# Patient Record
Sex: MALE | Race: WHITE | HISPANIC OR LATINO | Employment: STUDENT | URBAN - METROPOLITAN AREA
[De-identification: names, ages, dates, MRNs, and addresses within clinical notes are randomized per-mention and may not be internally consistent; named-entity substitution may affect disease eponyms.]

---

## 2022-07-29 ENCOUNTER — PATIENT MESSAGE (OUTPATIENT)
Dept: FAMILY MEDICINE | Facility: CLINIC | Age: 22
End: 2022-07-29

## 2022-07-29 DIAGNOSIS — Z00.00 HEALTHCARE MAINTENANCE: Primary | ICD-10-CM

## 2022-07-29 DIAGNOSIS — E78.1 HYPERTRIGLYCERIDEMIA: ICD-10-CM

## 2022-07-30 NOTE — TELEPHONE ENCOUNTER
Please call patient and tell him that orders have been placed to obtain a lipid panel as well as routine labs for him for initially appointment evaluation.  He is to obtain these after an overnight fast of at least 8 hours.  These can be scheduled.  Look for to me him soon

## 2022-08-01 ENCOUNTER — PATIENT MESSAGE (OUTPATIENT)
Dept: FAMILY MEDICINE | Facility: CLINIC | Age: 22
End: 2022-08-01

## 2022-08-04 ENCOUNTER — OFFICE VISIT (OUTPATIENT)
Dept: FAMILY MEDICINE | Facility: CLINIC | Age: 22
End: 2022-08-04
Payer: COMMERCIAL

## 2022-08-04 VITALS
OXYGEN SATURATION: 98 % | SYSTOLIC BLOOD PRESSURE: 120 MMHG | DIASTOLIC BLOOD PRESSURE: 60 MMHG | BODY MASS INDEX: 27.62 KG/M2 | HEIGHT: 69 IN | WEIGHT: 186.5 LBS | HEART RATE: 70 BPM

## 2022-08-04 DIAGNOSIS — Z83.438 FAMILY HISTORY OF HYPERLIPIDEMIA: ICD-10-CM

## 2022-08-04 DIAGNOSIS — E66.3 OVERWEIGHT (BMI 25.0-29.9): ICD-10-CM

## 2022-08-04 DIAGNOSIS — E78.1 HYPERTRIGLYCERIDEMIA: Primary | ICD-10-CM

## 2022-08-04 DIAGNOSIS — Z76.89 ENCOUNTER TO ESTABLISH CARE WITH NEW DOCTOR: ICD-10-CM

## 2022-08-04 DIAGNOSIS — Z83.3 FAMILY HISTORY OF DIABETES MELLITUS: ICD-10-CM

## 2022-08-04 DIAGNOSIS — E78.5 DYSLIPIDEMIA: ICD-10-CM

## 2022-08-04 DIAGNOSIS — R73.01 IMPAIRED FASTING GLUCOSE: ICD-10-CM

## 2022-08-04 DIAGNOSIS — Z01.89 ENCOUNTER FOR LABORATORY TEST: ICD-10-CM

## 2022-08-04 DIAGNOSIS — R74.01 TRANSAMINITIS: ICD-10-CM

## 2022-08-04 PROCEDURE — 99214 PR OFFICE/OUTPT VISIT, EST, LEVL IV, 30-39 MIN: ICD-10-PCS | Mod: S$PBB,,, | Performed by: INTERNAL MEDICINE

## 2022-08-04 PROCEDURE — 99999 PR PBB SHADOW E&M-EST. PATIENT-LVL III: ICD-10-PCS | Mod: PBBFAC,,, | Performed by: INTERNAL MEDICINE

## 2022-08-04 PROCEDURE — 99999 PR PBB SHADOW E&M-EST. PATIENT-LVL III: CPT | Mod: PBBFAC,,, | Performed by: INTERNAL MEDICINE

## 2022-08-04 PROCEDURE — 99214 OFFICE O/P EST MOD 30 MIN: CPT | Mod: S$PBB,,, | Performed by: INTERNAL MEDICINE

## 2022-08-04 NOTE — PATIENT INSTRUCTIONS
Hypertriglyceridemia: Maintain low fat high fiber diet, exercise regularly. Weight reduction where indicated. Limit dairy products; keep well hydrated. Avoid any alcohol.   -     Lipid Panel; Future; Expected date: 08/04/2022  -     Comprehensive Metabolic Panel; Future; Expected date: 08/04/2022    Dyslipidemia; increase aerobic activity.   -     Lipid Panel; Future; Expected date: 08/04/2022  -     Comprehensive Metabolic Panel; Future; Expected date: 08/04/2022    Overweight (BMI 25.0-29.9; Caloric restriction w regular exercise and weight reduction.    Family history of hyperlipidemia    Impaired fasting glucose: Exercise recommended with weight reduction and low carb diet; we'll follow hemoglobin A1c's with you periodically.  -     Hemoglobin A1C; Future; Expected date: 08/04/2022  -     Comprehensive Metabolic Panel; Future; Expected date: 08/04/2022    Family history of diabetes mellitus    Encounter for laboratory test    Transaminitis; no NSAID agents; limit tylenol use as well; keep well hydrated w min imum 6-7 glasses of fluid a day  -     Comprehensive Metabolic Panel; Future; Expected date: 08/04/2022

## 2022-08-04 NOTE — PROGRESS NOTES
Subjective:       Patient ID: Sunil Van is a 22 y.o. male.    Chief Complaint:  Here today to establish care  HPI:  Patient here today to establish care with me as his new PCP at [facility].  Hypertriglyceridemia: Maintain low fat high fiber diet, exercise regularly. Weight reduction where indicated. Limit dairy products; keep well hydrated. Avoid any alcohol.  Lipid profile:  Cholesterol 156/triglyceride 280/HDL 29/LDL 71.  Add Metamucil once daily  -     Lipid Panel; Future; Expected date: 08/04/2022  -     Comprehensive Metabolic Panel; Future; Expected date: 08/04/2022  Dyslipidemia; increase aerobic activity.   -     Lipid Panel; Future; Expected date: 08/04/2022  -     Comprehensive Metabolic Panel; Future; Expected date: 08/04/2022  Overweight (BMI 25.0-29.9; BMI 27.54.  Caloric restriction w regular exercise and weight reduction.  Last year gained a lot a weight with COVID infection.  One thousand four hundred level triglyceride noted then.  No alcohol.  Lost a lot a week 10 15 lb with being in the marching band.   Family history of hyperlipidemia: Mom with elevated cholesterol levels  Impaired fasting glucose: Exercise recommended with weight reduction and low carb diet; we'll follow hemoglobin A1c's with you periodically.  Impaired fasting blood sugar at 100.  Hemoglobin A1c with follow-up FBS  -     Hemoglobin A1C; Future; Expected date: 08/04/2022  -     Comprehensive Metabolic Panel; Future; Expected date: 08/04/2022  Family history of diabetes mellitus: Maternal aunt and maternal great grandmother  Encounter for laboratory test:  Labs reviewed with patient ordered follow-up.  Transaminitis; ALT 53.  no NSAID agents; limit tylenol use as well; keep well hydrated w minimum 6-7 glasses of fluid a day; wants to wait on US abd; work on diet and exercise. Keep well hydrated.  Wants to wait on checking hepatitis-B virus/hepatitis C virus/and ferritin level  -     Comprehensive  "Metabolic Panel; Future; Expected date: 08/04/2022  Total time 5:13 p.m. through 5:57 p.m..  Greater than 50% of the time spent in discussion, counseling, and review.  Medications reviewed and addressed.  Labs reviewed and discussed with patient and ordered for follow-up.  Various different diagnosis is addressed as well as plan of care.  .    Review of Systems   Constitutional: Negative for appetite change and unexpected weight change.   HENT: Negative for congestion, postnasal drip, rhinorrhea and sinus pressure.         Denies seasonal allergies, or perennial allergies   Eyes: Negative for discharge and itching.   Respiratory: Negative for cough, chest tightness, shortness of breath and wheezing.    Cardiovascular: Negative for chest pain, palpitations and leg swelling.   Gastrointestinal: Negative for abdominal pain, blood in stool, constipation, diarrhea, nausea and vomiting.   Endocrine: Negative for polydipsia, polyphagia and polyuria.   Genitourinary: Negative for dysuria and hematuria.   Musculoskeletal: Negative for arthralgias and myalgias.   Skin: Negative for rash.   Allergic/Immunologic: Negative for environmental allergies and food allergies.   Neurological: Negative for tremors, seizures and headaches.   Hematological: Negative for adenopathy. Does not bruise/bleed easily.   Psychiatric/Behavioral: Negative for dysphoric mood. The patient is not nervous/anxious.         Denies anxiety or depression.      Objective:        Vitals:    08/04/22 1647   BP: 120/60   Pulse: 70   SpO2: 98%   Weight: 84.6 kg (186 lb 8.2 oz)   Height: 5' 9" (1.753 m)       BMI Readings from Last 3 Encounters:   08/04/22 27.54 kg/m²   06/07/11 16.88 kg/m² (46 %, Z= -0.11)*     * Growth percentiles are based on CDC (Boys, 2-20 Years) data.        Wt Readings from Last 3 Encounters:   08/04/22 1647 84.6 kg (186 lb 8.2 oz)   06/07/11 0212 37.7 kg (83 lb 2.9 oz) (63 %, Z= 0.33)*   11/18/10 0359 35 kg (77 lb 2.6 oz) (61 %, Z= " 0.29)*     * Growth percentiles are based on [facility] (Boys, 2-20 Years) data.        BP Readings from Last 3 Encounters:   08/04/22 120/60   06/07/11 108/56 (73 %, Z = 0.61 /  26 %, Z = -0.64)*     *BP percentiles are based on the 2017 AAP Clinical Practice Guideline for boys            Topic Date Due    HPV Vaccines (1 - Male 2-dose series) Never done        Health Maintenance Due   Topic Date Due    Hepatitis C Screening  Never done    HPV Vaccines (1 - Male 2-dose series) Never done    HIV Screening  Never done    TETANUS VACCINE  Never done    COVID-19 Vaccine (2 - Moderna series) 04/21/2021         No past medical history on file.    No past surgical history on file.    Social History     Tobacco Use    Smoking status: Never Smoker    Smokeless tobacco: Never Used       No family history on file.    Review of patient's allergies indicates:  No Known Allergies    No current outpatient medications on file prior to visit.     No current facility-administered medications on file prior to visit.       Physical Exam  Vitals reviewed.   Constitutional:       Appearance: He is well-developed.   HENT:      Head: Normocephalic and atraumatic.   Neck:      Thyroid: No thyromegaly.   Cardiovascular:      Rate and Rhythm: Normal rate and regular rhythm.      Heart sounds: Normal heart sounds. No murmur heard.    No gallop.   Pulmonary:      Effort: Pulmonary effort is normal. No respiratory distress.      Breath sounds: Normal breath sounds. No wheezing or rales.   Abdominal:      General: Bowel sounds are normal. There is no distension.      Palpations: Abdomen is soft.      Tenderness: There is no abdominal tenderness. There is no guarding or rebound.   Musculoskeletal:         General: Normal range of motion.      Cervical back: Normal range of motion and neck supple.      Right lower leg: No edema.      Left lower leg: No edema.   Lymphadenopathy:      Cervical: No cervical adenopathy.   Skin:     Findings: No rash.    Neurological:      Mental Status: He is alert and oriented to person, place, and time.      Comments: Moves all 4 extremities fine.   Psychiatric:         Behavior: Behavior normal.         Thought Content: Thought content normal.         Assessment:       1. Hypertriglyceridemia    2. Dyslipidemia    3. Overweight (BMI 25.0-29.9)    4. Family history of hyperlipidemia    5. Impaired fasting glucose    6. Family history of diabetes mellitus    7. Encounter for laboratory test    8. Transaminitis    9. Encounter to establish care with new doctor        Plan:       Hypertriglyceridemia: Maintain low fat high fiber diet, exercise regularly. Weight reduction  indicated. Limit dairy products; keep well hydrated. Avoid any alcohol.   -     Lipid Panel; Future; Expected date: 08/04/2022  -     Comprehensive Metabolic Panel; Future; Expected date: 08/04/2022    Dyslipidemia; increase aerobic activity.   -     Lipid Panel; Future; Expected date: 08/04/2022  -     Comprehensive Metabolic Panel; Future; Expected date: 08/04/2022    Overweight (BMI 25.0-29.9; BMI 27.54.  Caloric restriction w regular exercise and weight reduction.    Family history of hyperlipidemia:  Mom with elevated cholesterol.    Impaired fasting glucose: Exercise recommended with weight reduction and low carb diet; we'll follow hemoglobin A1c's with you periodically.  -     Hemoglobin A1C; Future; Expected date: 08/04/2022  -     Comprehensive Metabolic Panel; Future; Expected date: 08/04/2022    Family history of diabetes mellitus: Involving maternal aunt and maternal great grandmother    Encounter for laboratory test: Labs reviewed and discussed with patient at length in ordered for follow-up.  Wants to wait on hepatitis-B virus/hepatitis C virus/and ferritin levels    Transaminitis; no NSAID agents; limit tylenol use as well; keep well hydrated w minimum 6-7 glasses of fluid a day; wants to wait on US abd; work on diet and exercise. Keep well  hydrated.  -     Comprehensive Metabolic Panel; Future; Expected date: 08/04/2022    Encounter to establish care with new physician

## 2022-08-20 PROBLEM — R73.01 IMPAIRED FASTING GLUCOSE: Status: ACTIVE | Noted: 2022-08-20

## 2022-08-20 PROBLEM — Z01.89 ENCOUNTER FOR LABORATORY TEST: Status: ACTIVE | Noted: 2022-08-20

## 2022-08-20 PROBLEM — E78.1 HYPERTRIGLYCERIDEMIA: Status: ACTIVE | Noted: 2022-08-20

## 2022-08-20 PROBLEM — E66.3 OVERWEIGHT (BMI 25.0-29.9): Status: ACTIVE | Noted: 2022-08-20

## 2022-08-20 PROBLEM — E78.5 DYSLIPIDEMIA: Status: ACTIVE | Noted: 2022-08-20

## 2022-08-20 PROBLEM — Z83.3 FAMILY HISTORY OF DIABETES MELLITUS: Status: ACTIVE | Noted: 2022-08-20

## 2022-08-20 PROBLEM — R74.01 TRANSAMINITIS: Status: ACTIVE | Noted: 2022-08-20

## 2022-08-20 PROBLEM — Z83.438 FAMILY HISTORY OF HYPERLIPIDEMIA: Status: ACTIVE | Noted: 2022-08-20

## 2022-12-05 ENCOUNTER — PATIENT OUTREACH (OUTPATIENT)
Dept: ADMINISTRATIVE | Facility: HOSPITAL | Age: 22
End: 2022-12-05

## 2023-07-05 ENCOUNTER — LAB VISIT (OUTPATIENT)
Dept: LAB | Facility: HOSPITAL | Age: 23
End: 2023-07-05
Attending: INTERNAL MEDICINE
Payer: COMMERCIAL

## 2023-07-05 ENCOUNTER — OFFICE VISIT (OUTPATIENT)
Dept: FAMILY MEDICINE | Facility: CLINIC | Age: 23
End: 2023-07-05
Payer: COMMERCIAL

## 2023-07-05 VITALS
SYSTOLIC BLOOD PRESSURE: 122 MMHG | RESPIRATION RATE: 18 BRPM | OXYGEN SATURATION: 98 % | BODY MASS INDEX: 28.99 KG/M2 | WEIGHT: 195.75 LBS | DIASTOLIC BLOOD PRESSURE: 78 MMHG | HEART RATE: 57 BPM | HEIGHT: 69 IN

## 2023-07-05 DIAGNOSIS — E66.3 OVERWEIGHT (BMI 25.0-29.9): ICD-10-CM

## 2023-07-05 DIAGNOSIS — R73.01 IMPAIRED FASTING GLUCOSE: ICD-10-CM

## 2023-07-05 DIAGNOSIS — Z11.4 SCREENING FOR HIV (HUMAN IMMUNODEFICIENCY VIRUS): ICD-10-CM

## 2023-07-05 DIAGNOSIS — Z83.438 FAMILY HISTORY OF HYPERLIPIDEMIA: ICD-10-CM

## 2023-07-05 DIAGNOSIS — E78.1 HYPERTRIGLYCERIDEMIA: ICD-10-CM

## 2023-07-05 DIAGNOSIS — Z11.59 ENCOUNTER FOR HCV SCREENING TEST FOR LOW RISK PATIENT: ICD-10-CM

## 2023-07-05 DIAGNOSIS — R74.01 TRANSAMINITIS: ICD-10-CM

## 2023-07-05 DIAGNOSIS — E78.5 DYSLIPIDEMIA: ICD-10-CM

## 2023-07-05 DIAGNOSIS — Z83.3 FAMILY HISTORY OF DIABETES MELLITUS: ICD-10-CM

## 2023-07-05 DIAGNOSIS — Z00.00 HEALTHCARE MAINTENANCE: ICD-10-CM

## 2023-07-05 DIAGNOSIS — Z00.00 ANNUAL PHYSICAL EXAM: Primary | ICD-10-CM

## 2023-07-05 LAB
ALBUMIN SERPL BCP-MCNC: 4.3 G/DL (ref 3.5–5.2)
ALP SERPL-CCNC: 69 U/L (ref 55–135)
ALT SERPL W/O P-5'-P-CCNC: 34 U/L (ref 10–44)
ANION GAP SERPL CALC-SCNC: 9 MMOL/L (ref 8–16)
AST SERPL-CCNC: 17 U/L (ref 10–40)
BASOPHILS # BLD AUTO: 0.03 K/UL (ref 0–0.2)
BASOPHILS NFR BLD: 0.4 % (ref 0–1.9)
BILIRUB SERPL-MCNC: 0.4 MG/DL (ref 0.1–1)
BUN SERPL-MCNC: 9 MG/DL (ref 6–20)
CALCIUM SERPL-MCNC: 9.2 MG/DL (ref 8.7–10.5)
CHLORIDE SERPL-SCNC: 107 MMOL/L (ref 95–110)
CHOLEST SERPL-MCNC: 127 MG/DL (ref 120–199)
CHOLEST/HDLC SERPL: 5.1 {RATIO} (ref 2–5)
CO2 SERPL-SCNC: 26 MMOL/L (ref 23–29)
CREAT SERPL-MCNC: 0.9 MG/DL (ref 0.5–1.4)
DIFFERENTIAL METHOD: NORMAL
EOSINOPHIL # BLD AUTO: 0.1 K/UL (ref 0–0.5)
EOSINOPHIL NFR BLD: 1 % (ref 0–8)
ERYTHROCYTE [DISTWIDTH] IN BLOOD BY AUTOMATED COUNT: 12.8 % (ref 11.5–14.5)
EST. GFR  (NO RACE VARIABLE): >60 ML/MIN/1.73 M^2
ESTIMATED AVG GLUCOSE: 97 MG/DL (ref 68–131)
GLUCOSE SERPL-MCNC: 96 MG/DL (ref 70–110)
HBA1C MFR BLD: 5 % (ref 4–5.6)
HCT VFR BLD AUTO: 44.6 % (ref 40–54)
HCV AB SERPL QL IA: NORMAL
HDLC SERPL-MCNC: 25 MG/DL (ref 40–75)
HDLC SERPL: 19.7 % (ref 20–50)
HGB BLD-MCNC: 15.2 G/DL (ref 14–18)
HIV 1+2 AB+HIV1 P24 AG SERPL QL IA: NORMAL
IMM GRANULOCYTES # BLD AUTO: 0.02 K/UL (ref 0–0.04)
IMM GRANULOCYTES NFR BLD AUTO: 0.3 % (ref 0–0.5)
LDLC SERPL CALC-MCNC: 37.4 MG/DL (ref 63–159)
LYMPHOCYTES # BLD AUTO: 2.8 K/UL (ref 1–4.8)
LYMPHOCYTES NFR BLD: 41 % (ref 18–48)
MCH RBC QN AUTO: 30 PG (ref 27–31)
MCHC RBC AUTO-ENTMCNC: 34.1 G/DL (ref 32–36)
MCV RBC AUTO: 88 FL (ref 82–98)
MONOCYTES # BLD AUTO: 0.8 K/UL (ref 0.3–1)
MONOCYTES NFR BLD: 11 % (ref 4–15)
NEUTROPHILS # BLD AUTO: 3.2 K/UL (ref 1.8–7.7)
NEUTROPHILS NFR BLD: 46.3 % (ref 38–73)
NONHDLC SERPL-MCNC: 102 MG/DL
NRBC BLD-RTO: 0 /100 WBC
PLATELET # BLD AUTO: 235 K/UL (ref 150–450)
PMV BLD AUTO: 10.3 FL (ref 9.2–12.9)
POTASSIUM SERPL-SCNC: 3.9 MMOL/L (ref 3.5–5.1)
PROT SERPL-MCNC: 7.2 G/DL (ref 6–8.4)
RBC # BLD AUTO: 5.07 M/UL (ref 4.6–6.2)
SODIUM SERPL-SCNC: 142 MMOL/L (ref 136–145)
TRIGL SERPL-MCNC: 323 MG/DL (ref 30–150)
TSH SERPL DL<=0.005 MIU/L-ACNC: 1.4 UIU/ML (ref 0.4–4)
WBC # BLD AUTO: 6.91 K/UL (ref 3.9–12.7)

## 2023-07-05 PROCEDURE — 87389 HIV-1 AG W/HIV-1&-2 AB AG IA: CPT | Performed by: INTERNAL MEDICINE

## 2023-07-05 PROCEDURE — 3044F PR MOST RECENT HEMOGLOBIN A1C LEVEL <7.0%: ICD-10-PCS | Mod: CPTII,S$GLB,, | Performed by: INTERNAL MEDICINE

## 2023-07-05 PROCEDURE — 99999 PR PBB SHADOW E&M-EST. PATIENT-LVL III: CPT | Mod: PBBFAC,,, | Performed by: INTERNAL MEDICINE

## 2023-07-05 PROCEDURE — 80061 LIPID PANEL: CPT | Performed by: INTERNAL MEDICINE

## 2023-07-05 PROCEDURE — 84443 ASSAY THYROID STIM HORMONE: CPT | Performed by: INTERNAL MEDICINE

## 2023-07-05 PROCEDURE — 36415 COLL VENOUS BLD VENIPUNCTURE: CPT | Mod: PN | Performed by: INTERNAL MEDICINE

## 2023-07-05 PROCEDURE — 1160F PR REVIEW ALL MEDS BY PRESCRIBER/CLIN PHARMACIST DOCUMENTED: ICD-10-PCS | Mod: CPTII,S$GLB,, | Performed by: INTERNAL MEDICINE

## 2023-07-05 PROCEDURE — 3008F PR BODY MASS INDEX (BMI) DOCUMENTED: ICD-10-PCS | Mod: CPTII,S$GLB,, | Performed by: INTERNAL MEDICINE

## 2023-07-05 PROCEDURE — 3008F BODY MASS INDEX DOCD: CPT | Mod: CPTII,S$GLB,, | Performed by: INTERNAL MEDICINE

## 2023-07-05 PROCEDURE — 3078F PR MOST RECENT DIASTOLIC BLOOD PRESSURE < 80 MM HG: ICD-10-PCS | Mod: CPTII,S$GLB,, | Performed by: INTERNAL MEDICINE

## 2023-07-05 PROCEDURE — 99395 PREV VISIT EST AGE 18-39: CPT | Mod: S$GLB,,, | Performed by: INTERNAL MEDICINE

## 2023-07-05 PROCEDURE — 3074F SYST BP LT 130 MM HG: CPT | Mod: CPTII,S$GLB,, | Performed by: INTERNAL MEDICINE

## 2023-07-05 PROCEDURE — 83036 HEMOGLOBIN GLYCOSYLATED A1C: CPT | Performed by: INTERNAL MEDICINE

## 2023-07-05 PROCEDURE — 99395 PR PREVENTIVE VISIT,EST,18-39: ICD-10-PCS | Mod: S$GLB,,, | Performed by: INTERNAL MEDICINE

## 2023-07-05 PROCEDURE — 1159F PR MEDICATION LIST DOCUMENTED IN MEDICAL RECORD: ICD-10-PCS | Mod: CPTII,S$GLB,, | Performed by: INTERNAL MEDICINE

## 2023-07-05 PROCEDURE — 3074F PR MOST RECENT SYSTOLIC BLOOD PRESSURE < 130 MM HG: ICD-10-PCS | Mod: CPTII,S$GLB,, | Performed by: INTERNAL MEDICINE

## 2023-07-05 PROCEDURE — 3044F HG A1C LEVEL LT 7.0%: CPT | Mod: CPTII,S$GLB,, | Performed by: INTERNAL MEDICINE

## 2023-07-05 PROCEDURE — 99999 PR PBB SHADOW E&M-EST. PATIENT-LVL III: ICD-10-PCS | Mod: PBBFAC,,, | Performed by: INTERNAL MEDICINE

## 2023-07-05 PROCEDURE — 85025 COMPLETE CBC W/AUTO DIFF WBC: CPT | Performed by: INTERNAL MEDICINE

## 2023-07-05 PROCEDURE — 1160F RVW MEDS BY RX/DR IN RCRD: CPT | Mod: CPTII,S$GLB,, | Performed by: INTERNAL MEDICINE

## 2023-07-05 PROCEDURE — 86803 HEPATITIS C AB TEST: CPT | Performed by: INTERNAL MEDICINE

## 2023-07-05 PROCEDURE — 80053 COMPREHEN METABOLIC PANEL: CPT | Performed by: INTERNAL MEDICINE

## 2023-07-05 PROCEDURE — 1159F MED LIST DOCD IN RCRD: CPT | Mod: CPTII,S$GLB,, | Performed by: INTERNAL MEDICINE

## 2023-07-05 PROCEDURE — 3078F DIAST BP <80 MM HG: CPT | Mod: CPTII,S$GLB,, | Performed by: INTERNAL MEDICINE

## 2023-07-05 NOTE — PROGRESS NOTES
Subjective:       Patient ID: Sunil Van is a 22 y.o. male.      Patient here today to perform his Annual Wellness evaluation with me.  Past medical history and surgical history delineated and noted. Social medical history and family medical history also delineated and noted.  Review of systems obtained at length prior to physical exam being performed.  Medications reviewed as well and addressed.  Labs reviewed and ordered for follow-up as needed.      Chief Complaint: Annual Exam    HPI:  Here today for an annual wellness examination.  Annual Wellness Plan:  Maintain healthy lifestyle choices; regular exercise with caloric restriction where needed to lose weight.  Limit caffeine and alcohol intake when needed.  Keep follow up appointments with providers as directed and obtain workups as recommended as well. Obtain annual physical exam.  Patient does not smoke cigarette or vapes.  No alcohol intake.    Annual physical exam    Hypertriglyceridemia: limit dairy products; exercise.  Continued attempts at weight reduction with BMI 28.91 overweight.  And limit any alcohol intake as well  -     Comprehensive Metabolic Panel; Future; Expected date: 07/05/2023  -     Lipid Panel; Future; Expected date: 07/05/2023    Dyslipidemia; aerobic activity increase.   -     Comprehensive Metabolic Panel; Future; Expected date: 07/05/2023  -     Lipid Panel; Future; Expected date: 07/05/2023    Overweight (BMI 25.0-29.9); low-fat diet with caloric restriction w regular exercise and weight reduction.   -     Comprehensive Metabolic Panel; Future; Expected date: 07/05/2023  -     Hemoglobin A1C; Future; Expected date: 07/05/2023  -     TSH; Future; Expected date: 07/05/2023    Family history of hyperlipidemia    Impaired fasting glucose; .Exercise recommended with weight reduction and low carb diet; we'll follow hemoglobin A1c's with you periodically.  -     Comprehensive Metabolic Panel; Future; Expected date: 07/05/2023  -    "  Hemoglobin A1C; Future; Expected date: 07/05/2023  -     TSH; Future; Expected date: 07/05/2023    Family history of diabetes mellitus: maternal aunt and maternal great grandmother.   -     Comprehensive Metabolic Panel; Future; Expected date: 07/05/2023  -     Hemoglobin A1C; Future; Expected date: 07/05/2023    Transaminitis; limit NSAID use like aleve, ibuprofen; keep well hydrated at 6-8 glasses of fluid a day.  Can use Tylenol over-the-counter if needed for any pain relief.  -     Comprehensive Metabolic Panel; Future; Expected date: 07/05/2023    Encounter for HCV screening test for low risk patient  -     Hepatitis C Antibody; Future; Expected date: 07/05/2023    Screening for HIV (human immunodeficiency virus)  -     HIV 1/2 Ag/Ab (4th Gen); Future; Expected date: 07/05/2023    Healthcare maintenance  -     Comprehensive Metabolic Panel; Future; Expected date: 07/05/2023  -     Lipid Panel; Future; Expected date: 07/05/2023  -     Hemoglobin A1C; Future; Expected date: 07/05/2023  -     CBC Auto Differential; Future; Expected date: 07/05/2023  -     Hepatitis C Antibody; Future; Expected date: 07/05/2023  -     HIV 1/2 Ag/Ab (4th Gen); Future; Expected date: 07/05/2023  -     TSH; Future; Expected date: 07/05/2023          Vitals:    07/05/23 0925   BP: 122/78   BP Location: Left arm   Patient Position: Sitting   Pulse: (!) 57   Resp: 18   SpO2: 98%   Weight: 88.8 kg (195 lb 12.3 oz)   Height: 5' 9" (1.753 m)       BMI Readings from Last 3 Encounters:   07/05/23 28.91 kg/m²   08/04/22 27.54 kg/m²   06/07/11 16.88 kg/m² (46 %, Z= -0.11)*     * Growth percentiles are based on CDC (Boys, 2-20 Years) data.        Wt Readings from Last 3 Encounters:   07/05/23 0925 88.8 kg (195 lb 12.3 oz)   08/04/22 1647 84.6 kg (186 lb 8.2 oz)   06/07/11 0212 37.7 kg (83 lb 2.9 oz) (63 %, Z= 0.33)*   11/18/10 0359 35 kg (77 lb 2.6 oz) (61 %, Z= 0.29)*     * Growth percentiles are based on CDC (Boys, 2-20 Years) data.    " "    BP Readings from Last 3 Encounters:   07/05/23 122/78   08/04/22 120/60   06/07/11 108/56 (73 %, Z = 0.61 /  26 %, Z = -0.64)*     *BP percentiles are based on the 2017 AAP Clinical Practice Guideline for boys        There are no preventive care reminders to display for this patient.     Health Maintenance Due   Topic Date Due    Hepatitis C Screening  Never done    Hemoglobin A1c (Prediabetes)  Never done    HIV Screening  Never done           No past medical history on file.    No past surgical history on file.    Social History     Tobacco Use    Smoking status: Never    Smokeless tobacco: Never       No family history on file.    Review of patient's allergies indicates:  No Known Allergies    No current outpatient medications on file prior to visit.     No current facility-administered medications on file prior to visit.         Review of Systems   Constitutional:  Positive for activity change. Negative for unexpected weight change.        Has been working out more; few times a week; about 1-2 hrs a time.    HENT:  Negative for hearing loss, rhinorrhea and trouble swallowing.    Eyes:  Negative for discharge and visual disturbance.   Respiratory:  Negative for chest tightness and wheezing.    Cardiovascular:  Negative for chest pain and palpitations.   Gastrointestinal:  Negative for blood in stool, constipation, diarrhea and vomiting.   Endocrine: Negative for polydipsia and polyuria.   Genitourinary:  Negative for difficulty urinating, hematuria and urgency.   Musculoskeletal:  Negative for arthralgias, joint swelling and neck pain.   Neurological:  Negative for weakness and headaches.   Psychiatric/Behavioral:  Negative for confusion and dysphoric mood. The patient is not nervous/anxious.      Objective:      Vitals:    07/05/23 0925   BP: 122/78   BP Location: Left arm   Patient Position: Sitting   Pulse: (!) 57   Resp: 18   SpO2: 98%   Weight: 88.8 kg (195 lb 12.3 oz)   Height: 5' 9" (1.753 m)     Body " mass index is 28.91 kg/m².    Physical Exam  Vitals reviewed.   Constitutional:       Appearance: He is well-developed.   HENT:      Head: Normocephalic and atraumatic.      Comments: Throat pink and moist.   Neck:      Thyroid: No thyromegaly.   Cardiovascular:      Rate and Rhythm: Normal rate and regular rhythm.      Heart sounds: Normal heart sounds. No murmur heard.    No gallop.   Pulmonary:      Effort: Pulmonary effort is normal. No respiratory distress.      Breath sounds: Normal breath sounds. No wheezing or rales.   Abdominal:      General: Bowel sounds are normal. There is no distension.      Palpations: Abdomen is soft.      Tenderness: There is no abdominal tenderness. There is no guarding or rebound.   Musculoskeletal:         General: Normal range of motion.      Cervical back: Normal range of motion and neck supple.      Right lower leg: No edema.      Left lower leg: No edema.      Comments: No tenderness to palp T-L-sp.    Lymphadenopathy:      Cervical: No cervical adenopathy.   Skin:     Findings: No rash.   Neurological:      Mental Status: He is alert and oriented to person, place, and time.      Comments: Moves all 4 extremities fine.   Psychiatric:         Behavior: Behavior normal.         Thought Content: Thought content normal.       Assessment:       1. Annual physical exam    2. Hypertriglyceridemia    3. Dyslipidemia    4. Overweight (BMI 25.0-29.9)    5. Family history of hyperlipidemia    6. Impaired fasting glucose    7. Family history of diabetes mellitus    8. Transaminitis    9. Encounter for HCV screening test for low risk patient    10. Screening for HIV (human immunodeficiency virus)    11. Healthcare maintenance        Plan:       Annual Wellness Plan:  Maintain healthy lifestyle choices; regular exercise with caloric restriction where needed to lose weight.  Limit caffeine and alcohol intake when needed.  Keep follow up appointments with providers as directed and obtain  workups as recommended as well. Obtain annual physical exam.    Annual physical exam    Hypertriglyceridemia: limit dairy products; exercise.   -     Comprehensive Metabolic Panel; Future; Expected date: 07/05/2023  -     Lipid Panel; Future; Expected date: 07/05/2023    Dyslipidemia; aerobic activity increase.   -     Comprehensive Metabolic Panel; Future; Expected date: 07/05/2023  -     Lipid Panel; Future; Expected date: 07/05/2023    Overweight (BMI 25.0-29.9); Caloric restriction w regular exercise and weight reduction.   -     Comprehensive Metabolic Panel; Future; Expected date: 07/05/2023  -     Hemoglobin A1C; Future; Expected date: 07/05/2023  -     TSH; Future; Expected date: 07/05/2023    Family history of hyperlipidemia    Impaired fasting glucose; .Exercise recommended with weight reduction and low carb diet; we'll follow hemoglobin A1c's with you periodically.  -     Comprehensive Metabolic Panel; Future; Expected date: 07/05/2023  -     Hemoglobin A1C; Future; Expected date: 07/05/2023  -     TSH; Future; Expected date: 07/05/2023    Family history of diabetes mellitus: maternal aunt and maternal great grandmother.   -     Comprehensive Metabolic Panel; Future; Expected date: 07/05/2023  -     Hemoglobin A1C; Future; Expected date: 07/05/2023    Transaminitis; limit NSAID use like aleve, ibuprofen; keep well hydrated at 6-8 glasses of fluid a day.   -     Comprehensive Metabolic Panel; Future; Expected date: 07/05/2023    Encounter for HCV screening test for low risk patient  -     Hepatitis C Antibody; Future; Expected date: 07/05/2023    Screening for HIV (human immunodeficiency virus)  -     HIV 1/2 Ag/Ab (4th Gen); Future; Expected date: 07/05/2023    Healthcare maintenance  -     Comprehensive Metabolic Panel; Future; Expected date: 07/05/2023  -     Lipid Panel; Future; Expected date: 07/05/2023  -     Hemoglobin A1C; Future; Expected date: 07/05/2023  -     CBC Auto Differential; Future;  Expected date: 07/05/2023  -     Hepatitis C Antibody; Future; Expected date: 07/05/2023  -     HIV 1/2 Ag/Ab (4th Gen); Future; Expected date: 07/05/2023  -     TSH; Future; Expected date: 07/05/2023

## 2023-07-05 NOTE — PATIENT INSTRUCTIONS
Annual Wellness Plan:  Maintain healthy lifestyle choices; regular exercise with caloric restriction where needed to lose weight.  Limit caffeine and alcohol intake when needed.  Keep follow up appointments with providers as directed and obtain workups as recommended as well. Obtain annual physical exam.    Annual physical exam    Hypertriglyceridemia: limit dairy products; exercise.   -     Comprehensive Metabolic Panel; Future; Expected date: 07/05/2023  -     Lipid Panel; Future; Expected date: 07/05/2023    Dyslipidemia; aerobic activity increase.   -     Comprehensive Metabolic Panel; Future; Expected date: 07/05/2023  -     Lipid Panel; Future; Expected date: 07/05/2023    Overweight (BMI 25.0-29.9); Caloric restriction w regular exercise and weight reduction.   -     Comprehensive Metabolic Panel; Future; Expected date: 07/05/2023  -     Hemoglobin A1C; Future; Expected date: 07/05/2023  -     TSH; Future; Expected date: 07/05/2023    Family history of hyperlipidemia    Impaired fasting glucose; .Exercise recommended with weight reduction and low carb diet; we'll follow hemoglobin A1c's with you periodically.  -     Comprehensive Metabolic Panel; Future; Expected date: 07/05/2023  -     Hemoglobin A1C; Future; Expected date: 07/05/2023  -     TSH; Future; Expected date: 07/05/2023    Family history of diabetes mellitus: maternal aunt and maternal great grandmother.   -     Comprehensive Metabolic Panel; Future; Expected date: 07/05/2023  -     Hemoglobin A1C; Future; Expected date: 07/05/2023    Transaminitis; limit NSAID use like aleve, ibuprofen; keep well hydrated at 6-8 glasses of fluid a day.   -     Comprehensive Metabolic Panel; Future; Expected date: 07/05/2023    Encounter for HCV screening test for low risk patient  -     Hepatitis C Antibody; Future; Expected date: 07/05/2023    Screening for HIV (human immunodeficiency virus)  -     HIV 1/2 Ag/Ab (4th Gen); Future; Expected date:  07/05/2023    Healthcare maintenance  -     Comprehensive Metabolic Panel; Future; Expected date: 07/05/2023  -     Lipid Panel; Future; Expected date: 07/05/2023  -     Hemoglobin A1C; Future; Expected date: 07/05/2023  -     CBC Auto Differential; Future; Expected date: 07/05/2023  -     Hepatitis C Antibody; Future; Expected date: 07/05/2023  -     HIV 1/2 Ag/Ab (4th Gen); Future; Expected date: 07/05/2023  -     TSH; Future; Expected date: 07/05/2023

## 2023-07-05 NOTE — Clinical Note
Please call patient and have him schedule appointment to see Dr. Jorge Hernandez for follow-up in 4-6 weeks to go over his lab results, and to establish care with her as I will be retiring and will not be seeing patients past 08/11/2023.  Thank you

## 2023-07-16 ENCOUNTER — TELEPHONE (OUTPATIENT)
Dept: FAMILY MEDICINE | Facility: CLINIC | Age: 23
End: 2023-07-16
Payer: COMMERCIAL

## 2023-07-16 DIAGNOSIS — R73.01 IMPAIRED FASTING GLUCOSE: ICD-10-CM

## 2023-07-16 DIAGNOSIS — E78.1 HYPERTRIGLYCERIDEMIA: Primary | ICD-10-CM

## 2023-07-16 DIAGNOSIS — E78.5 DYSLIPIDEMIA: ICD-10-CM

## 2023-07-16 NOTE — TELEPHONE ENCOUNTER
Please call patient and tell him I have reviewed his lab work and there are some abnormalities there.  His triglyceride has worsened from 288 year ago to 323; with it need to be less than 150.  Before we start him on medications to bring it down I would like him to really give a concerted effort at limiting dairy products and exercising regularly as well as refraining from any alcohol which also can raise triglyceride levels.  And let us repeat a lipid profile in 3 months for reassessment then at that time then if the results are the same he should be started on Vascepa to help lower his triglyceride levels.       Please ask him to schedule appointment in the next 6-8 weeks to go over the results further with Dr. Jorge Hernandez and to establish care with her as his new PCP as I will be retiring.  Fasting blood sugar was normal at 96 with A1c normal at 5.0.

## 2023-10-04 ENCOUNTER — PATIENT MESSAGE (OUTPATIENT)
Dept: ADMINISTRATIVE | Facility: HOSPITAL | Age: 23
End: 2023-10-04
Payer: COMMERCIAL

## 2024-09-10 ENCOUNTER — OFFICE VISIT (OUTPATIENT)
Dept: FAMILY MEDICINE | Facility: CLINIC | Age: 24
End: 2024-09-10
Payer: COMMERCIAL

## 2024-09-10 ENCOUNTER — TELEPHONE (OUTPATIENT)
Dept: NEUROLOGY | Facility: CLINIC | Age: 24
End: 2024-09-10
Payer: COMMERCIAL

## 2024-09-10 VITALS
HEIGHT: 69 IN | BODY MASS INDEX: 29.31 KG/M2 | RESPIRATION RATE: 20 BRPM | HEART RATE: 71 BPM | OXYGEN SATURATION: 99 % | SYSTOLIC BLOOD PRESSURE: 122 MMHG | DIASTOLIC BLOOD PRESSURE: 86 MMHG | WEIGHT: 197.88 LBS

## 2024-09-10 DIAGNOSIS — E78.1 HYPERTRIGLYCERIDEMIA: ICD-10-CM

## 2024-09-10 DIAGNOSIS — R73.01 IMPAIRED FASTING GLUCOSE: ICD-10-CM

## 2024-09-10 DIAGNOSIS — Z83.438 FAMILY HISTORY OF HYPERLIPIDEMIA: ICD-10-CM

## 2024-09-10 DIAGNOSIS — Z00.00 WELLNESS EXAMINATION: Primary | ICD-10-CM

## 2024-09-10 DIAGNOSIS — R56.9 CONVULSIONS, UNSPECIFIED CONVULSION TYPE: ICD-10-CM

## 2024-09-10 DIAGNOSIS — R56.9 SEIZURE-LIKE ACTIVITY: ICD-10-CM

## 2024-09-10 DIAGNOSIS — Z83.3 FAMILY HISTORY OF DIABETES MELLITUS: ICD-10-CM

## 2024-09-10 DIAGNOSIS — Z23 IMMUNIZATION DUE: ICD-10-CM

## 2024-09-10 PROBLEM — Z01.89 ENCOUNTER FOR LABORATORY TEST: Status: RESOLVED | Noted: 2022-08-20 | Resolved: 2024-09-10

## 2024-09-10 PROBLEM — R74.01 TRANSAMINITIS: Status: RESOLVED | Noted: 2022-08-20 | Resolved: 2024-09-10

## 2024-09-10 PROBLEM — E66.3 OVERWEIGHT (BMI 25.0-29.9): Status: RESOLVED | Noted: 2022-08-20 | Resolved: 2024-09-10

## 2024-09-10 PROBLEM — E78.5 DYSLIPIDEMIA: Status: RESOLVED | Noted: 2022-08-20 | Resolved: 2024-09-10

## 2024-09-10 PROCEDURE — 99385 PREV VISIT NEW AGE 18-39: CPT | Mod: S$GLB,,, | Performed by: FAMILY MEDICINE

## 2024-09-10 PROCEDURE — 1160F RVW MEDS BY RX/DR IN RCRD: CPT | Mod: CPTII,S$GLB,, | Performed by: FAMILY MEDICINE

## 2024-09-10 PROCEDURE — 99999 PR PBB SHADOW E&M-EST. PATIENT-LVL IV: CPT | Mod: PBBFAC,,, | Performed by: FAMILY MEDICINE

## 2024-09-10 PROCEDURE — 3074F SYST BP LT 130 MM HG: CPT | Mod: CPTII,S$GLB,, | Performed by: FAMILY MEDICINE

## 2024-09-10 PROCEDURE — 3079F DIAST BP 80-89 MM HG: CPT | Mod: CPTII,S$GLB,, | Performed by: FAMILY MEDICINE

## 2024-09-10 PROCEDURE — 1159F MED LIST DOCD IN RCRD: CPT | Mod: CPTII,S$GLB,, | Performed by: FAMILY MEDICINE

## 2024-09-10 PROCEDURE — 3008F BODY MASS INDEX DOCD: CPT | Mod: CPTII,S$GLB,, | Performed by: FAMILY MEDICINE

## 2024-09-10 NOTE — PROGRESS NOTES
THIS DOCUMENT WAS MADE IN PART WITH VOICE RECOGNITION SOFTWARE.  OCCASIONALLY THIS SOFTWARE WILL MISINTERPRET WORDS OR PHRASES.    Assessment and Plan:    1. Wellness examination  CBC Auto Differential    Comprehensive Metabolic Panel    Lipid Panel    Hemoglobin A1C    TSH    T4, Free    Urinalysis, Reflex to Urine Culture Urine, Clean Catch    Urinalysis Microscopic      2. Immunization due        3. Hypertriglyceridemia        4. Family history of hyperlipidemia        5. Impaired fasting glucose        6. Family history of diabetes mellitus        7. Seizure-like activity  Ambulatory referral/consult to Neurology      8. Convulsions, unspecified convulsion type  MRI Brain W WO Contrast    EEG        Likely onset seizure   Neurologic exam negative   Recommended avoiding driving, reports ER with any new symptoms   EEG, MRI, neurology referral  Will reach out to neurology colleagues to see if ER visit today would be appropriate or if they can expedite appointment    Labs as above     Counseled on immunizations     Follow-up in 1 year  ______________________________________________________________________  Subjective:    Chief Complaint:  Chief Complaint   Patient presents with    Establish Care        HPI:  Sunil is a 24 y.o. year old           Hypertriglyceridemia   Rx-none   Denies any exertional chest discomfort or shortness of breath    History of seizure   09/10/2024-getting hair cut, tonic-clonic with loss of consciousness   Neurology-none        Past Medical History:  History reviewed. No pertinent past medical history.    Past Surgical History:  History reviewed. No pertinent surgical history.    Family History:  Family History   Problem Relation Name Age of Onset    Diabetes Mellitus Maternal Aunt         Social History:  Social History     Socioeconomic History    Marital status: Single   Tobacco Use    Smoking status: Never    Smokeless tobacco: Never   Substance and Sexual Activity    Alcohol use:  "Not Currently    Drug use: Never    Sexual activity: Not Currently     Social Determinants of Health     Financial Resource Strain: Patient Declined (9/7/2024)    Overall Financial Resource Strain (CARDIA)     Difficulty of Paying Living Expenses: Patient declined   Food Insecurity: No Food Insecurity (9/7/2024)    Hunger Vital Sign     Worried About Running Out of Food in the Last Year: Never true     Ran Out of Food in the Last Year: Never true   Physical Activity: Patient Declined (9/7/2024)    Exercise Vital Sign     Days of Exercise per Week: Patient declined     Minutes of Exercise per Session: Patient declined   Stress: No Stress Concern Present (9/7/2024)    [employer] [place] [employer] - Occupational Stress Questionnaire     Feeling of Stress : Not at all   Housing Stability: Unknown (9/7/2024)    Housing Stability Vital Sign     Unable to Pay for Housing in the Last Year: Patient declined       Medications:  No current outpatient medications on file prior to visit.     No current facility-administered medications on file prior to visit.       Allergies:  Patient has no known allergies.    Immunizations:  Immunization History   Administered Date(s) Administered    COVID-19, MRNA, LN-S, PF (MODERNA FULL 0.5 ML DOSE) 03/24/2021, 06/10/2022    Tdap 03/08/2017       Review of Systems:  Review of Systems   All other systems reviewed and are negative.      Objective:    Vitals:  Vitals:    09/10/24 1417   BP: 122/86   Pulse: 71   Resp: 20   SpO2: 99%   Weight: 89.8 kg (197 lb 13.8 oz)   Height: 5' 9" (1.753 m)   PainSc: 0-No pain       Physical Exam  Vitals reviewed.   Constitutional:       General: He is not in acute distress.  HENT:      Head: Normocephalic and atraumatic.   Eyes:      Pupils: Pupils are equal, round, and reactive to light.   Cardiovascular:      Rate and Rhythm: Normal rate and regular rhythm.      Heart sounds: No murmur heard.     No friction rub.   Pulmonary:      Effort: " Pulmonary effort is normal.      Breath sounds: Normal breath sounds.   Abdominal:      General: Bowel sounds are normal. There is no distension.      Palpations: Abdomen is soft.      Tenderness: There is no abdominal tenderness.   Musculoskeletal:      Cervical back: Neck supple.   Skin:     General: Skin is warm and dry.      Findings: No rash.   Neurological:      General: No focal deficit present.      Mental Status: He is alert and oriented to person, place, and time.      GCS: GCS eye subscore is 4. GCS verbal subscore is 5. GCS motor subscore is 6.      Cranial Nerves: Cranial nerves 2-12 are intact.      Sensory: Sensation is intact.      Motor: Motor function is intact.      Coordination: Coordination is intact. Romberg sign negative. Coordination normal. Finger-Nose-Finger Test normal.      Gait: Gait is intact.      Deep Tendon Reflexes:      Reflex Scores:       Tricep reflexes are 2+ on the right side and 2+ on the left side.       Bicep reflexes are 2+ on the right side and 2+ on the left side.       Brachioradialis reflexes are 2+ on the right side and 2+ on the left side.       Patellar reflexes are 1+ on the right side and 1+ on the left side.       Achilles reflexes are 2+ on the right side and 2+ on the left side.  Psychiatric:         Behavior: Behavior normal.             David Mendosa MD  Family Medicine

## 2024-09-12 DIAGNOSIS — R82.81 PYURIA: Primary | ICD-10-CM

## 2024-09-13 ENCOUNTER — PATIENT MESSAGE (OUTPATIENT)
Dept: FAMILY MEDICINE | Facility: CLINIC | Age: 24
End: 2024-09-13
Payer: COMMERCIAL

## 2024-09-13 ENCOUNTER — LAB VISIT (OUTPATIENT)
Dept: LAB | Facility: HOSPITAL | Age: 24
End: 2024-09-13
Attending: FAMILY MEDICINE
Payer: COMMERCIAL

## 2024-09-13 DIAGNOSIS — R82.81 PYURIA: ICD-10-CM

## 2024-09-13 PROCEDURE — 87591 N.GONORRHOEAE DNA AMP PROB: CPT | Performed by: FAMILY MEDICINE

## 2024-09-13 PROCEDURE — 81001 URINALYSIS AUTO W/SCOPE: CPT | Performed by: FAMILY MEDICINE

## 2024-09-13 PROCEDURE — 87491 CHLMYD TRACH DNA AMP PROBE: CPT | Performed by: FAMILY MEDICINE

## 2024-09-14 LAB
BACTERIA #/AREA URNS AUTO: NORMAL /HPF
BILIRUB UR QL STRIP: NEGATIVE
CA CARBONATE CRY UR QL COMP ASSIST: NORMAL
CLARITY UR REFRACT.AUTO: ABNORMAL
COLOR UR AUTO: YELLOW
GLUCOSE UR QL STRIP: NEGATIVE
HGB UR QL STRIP: NEGATIVE
KETONES UR QL STRIP: NEGATIVE
LEUKOCYTE ESTERASE UR QL STRIP: ABNORMAL
MICROSCOPIC COMMENT: NORMAL
NITRITE UR QL STRIP: NEGATIVE
PH UR STRIP: 6 [PH] (ref 5–8)
PROT UR QL STRIP: ABNORMAL
SP GR UR STRIP: >1.03 (ref 1–1.03)
URN SPEC COLLECT METH UR: ABNORMAL
WBC #/AREA URNS AUTO: 3 /HPF (ref 0–5)

## 2024-09-15 LAB
C TRACH DNA SPEC QL NAA+PROBE: DETECTED
N GONORRHOEA DNA SPEC QL NAA+PROBE: NOT DETECTED

## 2024-09-16 DIAGNOSIS — Z00.00 WELLNESS EXAMINATION: Primary | ICD-10-CM

## 2024-09-16 DIAGNOSIS — A74.9 CHLAMYDIA: Primary | ICD-10-CM

## 2024-09-16 RX ORDER — DOXYCYCLINE 100 MG/1
100 CAPSULE ORAL 2 TIMES DAILY
Qty: 14 CAPSULE | Refills: 0 | Status: SHIPPED | OUTPATIENT
Start: 2024-09-16

## 2024-09-18 ENCOUNTER — OFFICE VISIT (OUTPATIENT)
Dept: NEUROLOGY | Facility: CLINIC | Age: 24
End: 2024-09-18
Payer: COMMERCIAL

## 2024-09-18 VITALS
WEIGHT: 197.88 LBS | DIASTOLIC BLOOD PRESSURE: 72 MMHG | BODY MASS INDEX: 29.31 KG/M2 | HEIGHT: 69 IN | SYSTOLIC BLOOD PRESSURE: 134 MMHG | HEART RATE: 78 BPM

## 2024-09-18 DIAGNOSIS — R56.9 SEIZURE-LIKE ACTIVITY: ICD-10-CM

## 2024-09-18 PROCEDURE — 1159F MED LIST DOCD IN RCRD: CPT | Mod: CPTII,S$GLB,, | Performed by: PSYCHIATRY & NEUROLOGY

## 2024-09-18 PROCEDURE — 99204 OFFICE O/P NEW MOD 45 MIN: CPT | Mod: S$GLB,,, | Performed by: PSYCHIATRY & NEUROLOGY

## 2024-09-18 PROCEDURE — 3075F SYST BP GE 130 - 139MM HG: CPT | Mod: CPTII,S$GLB,, | Performed by: PSYCHIATRY & NEUROLOGY

## 2024-09-18 PROCEDURE — 3008F BODY MASS INDEX DOCD: CPT | Mod: CPTII,S$GLB,, | Performed by: PSYCHIATRY & NEUROLOGY

## 2024-09-18 PROCEDURE — 99999 PR PBB SHADOW E&M-EST. PATIENT-LVL IV: CPT | Mod: PBBFAC,,, | Performed by: PSYCHIATRY & NEUROLOGY

## 2024-09-18 PROCEDURE — 3078F DIAST BP <80 MM HG: CPT | Mod: CPTII,S$GLB,, | Performed by: PSYCHIATRY & NEUROLOGY

## 2024-09-18 PROCEDURE — 3044F HG A1C LEVEL LT 7.0%: CPT | Mod: CPTII,S$GLB,, | Performed by: PSYCHIATRY & NEUROLOGY

## 2024-09-18 PROCEDURE — 1160F RVW MEDS BY RX/DR IN RCRD: CPT | Mod: CPTII,S$GLB,, | Performed by: PSYCHIATRY & NEUROLOGY

## 2024-09-18 NOTE — PROGRESS NOTES
Date: 9/18/2024    Patient ID: Sunil Van is a 24 y.o. male.    Referring Provider:  David Mendosa MD    Chief Complaint: Seizures      History of Present Illness:  Mr. Van is a 24 y.o. male who presents referred by David Mendosa MD today for evaluation of first time seizure like activity.     He had a first time seizure like episode on 9/10/2024. He was getting his hair cut and had a witnessed convulsive episode. He recalls feeling a little dizzy beforehand and knows eh was definitely dizzy afterwards. He think she was out for just a few seconds but not sure. He then woke back up while still at the [employer]. He then went to see PCP the same day. He did not feel groggy or post-ictal after.      His PCP ordered MRI brain and EEG. These have not yet been done. Since it was a first time event, no medication was started. No further events have occurred since that time.     He passed out before in about 2016. He had passing out episodes. No woozy or lightheaded if he is having blood drawn or in pain.     No history of seizures or febrile seizures. No family history of seizures. No meningitis/encephalitis. No head trauma.     No new med changes. No drug, alcohol, or stimulant use.     Allergies:  Review of patient's allergies indicates:  No Known Allergies    Current Medications:  Current Outpatient Medications   Medication Sig Dispense Refill    doxycycline (MONODOX) 100 MG capsule Take 1 capsule (100 mg total) by mouth 2 (two) times daily. 14 capsule 0     No current facility-administered medications for this visit.       Past Medical History:  History reviewed. No pertinent past medical history.    Past Surgical History:  History reviewed. No pertinent surgical history.    Family History:  family history includes Diabetes Mellitus in his maternal aunt.    Social History:   reports that he has never smoked. He has never used smokeless tobacco. He reports that he does not currently use alcohol.  "He reports that he does not use drugs.    Physical Exam:  Vitals:    09/18/24 1311   BP: 134/72   Pulse: 78   Weight: 89.8 kg (197 lb 13.8 oz)   Height: 5' 9" (1.753 m)   PainSc: 0-No pain     Body mass index is 29.22 kg/m².    Neurological Exam:  Mental status: Awake, alert.  Speech/Language: No dysarthria or aphasia on conversation.   Cranial nerves: Pupils equal round and reactive to light, extraocular movements intact, facial strength and sensation intact bilaterally, tongue midline, hearing grossly intact bilaterally. Shoulder shrug normal bilaterally.   Motor: 5 out of 5 strength throughout the upper and lower extremities bilaterally. Normal bulk and tone.   Sensation: Intact to light touch and vibration bilaterally.  DTR: 1+ at the knees and biceps bilaterally.  Coordination: Finger-nose-finger testing and rapid alternating movements normal bilaterally. No tremor.   Gait: Normal tandem gait    Data:  I have personally reviewed the referring provider's notes, labs, & imaging made available to me today.     Labs:  CBC:   Lab Results   Component Value Date    WBC 7.57 09/17/2024    HGB 17.3 09/17/2024    HCT 50.6 09/17/2024     09/17/2024    MCV 85 09/17/2024    RDW 12.2 09/17/2024     BMP:   Lab Results   Component Value Date     09/17/2024    K 4.1 09/17/2024     09/17/2024    CO2 27 09/17/2024    BUN 18 09/17/2024    CREATININE 1.05 09/17/2024     09/17/2024    CALCIUM 9.6 09/17/2024     LFTS;   Lab Results   Component Value Date    PROT 7.5 09/17/2024    ALBUMIN 4.8 09/17/2024    BILITOT 0.8 09/17/2024    AST 25 09/17/2024    ALKPHOS 75 09/17/2024    ALT 36 09/17/2024     COAGS: No results found for: "INR", "PROTIME", "PTT"  FLP:   Lab Results   Component Value Date    CHOL 146 09/17/2024    HDL 25 (L) 09/17/2024    LDLCALC 59.8 (L) 09/17/2024    TRIG 306 (H) 09/17/2024    CHOLHDL 17.1 (L) 09/17/2024       Imaging:  MRI brain ordered and scheduled    Assessment and Plan:  " Carlota is a 24 y.o. male referred to me by David Mendosa MD for evaluation of seizure like activity. Discussed with Dr. Mendosa. History is limited due to being passed along by patient but he does recall feeling dizzy and waking up and being coherent in the [employer] so it seems like it was not out for very long. This makes it potentially more likely to have been convulsive syncope and not epileptic seizure but we can't be certain. Will obtain MRI brain and EEG. If these are normal, does not need anti-seizure medication. If either show risk for seizures, then I will contact him about a seizure medication. Cardiac workup may be warranted for syncope but defer to his PCP (and his father who is a cardiologist). If another episode occurs, advised he let notify us.     Discussed that he should not drive until seizure-free for 6 months. He voiced understanding.     Seizure-like activity  -     Ambulatory referral/consult to Neurology

## 2024-09-18 NOTE — PATIENT INSTRUCTIONS
From: Carmen Granados  To: Carin Vazquez  Sent: 1/9/2023 8:13 AM CST  Subject: Appointment needed    Hello  I have been struggling with an infection since Dec 28th. I have been seen at urgent care last week Monday, called them back Tuesday evening, and again seen yesterday at [facility] in [place]. All notes should be in my chart.   As of yesterday, the NP that I saw was concerned about the ear infection I now have, and have had since last week Tuesday, going into the bone. She suggested I follow up with you or go to ER if no improvement. Well, now I am even more concerned about all of this.   Is it possible to be seen? I have used the ear drops twice so far. My ear is visibly larger and swollen compared to my L. ear. Very painful, ringing, and hearing is impaired.     Thank you,     Carmen   We will get MRI brain and EEG to evaluate risk of future seizures. If these are normal, we do not have to start a seizure medication. In fact, this episode sounds more like a convulsive syncope and not epileptic seizure but we can't be sure without witnessing it. If another episode occurs, let me know about it right away.     During a seizure:  - Ensure the person is in a safe place, remove hard or sharp objects from the area  - Turn the person on their side  - Never force anything into their mouth  - Keep on eye on the time, if the jerking/shaking/tensing of the muscles lasts > 5 minutes, call 911.    After a seizure:  - Allow them to lie quietly, gently call them to see if they wake up  - If there is injury or if they are not waking up within 5 minutes, call 911    Safety:  - Take showers, not baths  - Take care when around bodies of water (swimming pools, lakes, etc) and wear protective equipment. Do not swim alone  - Use equipment with automatic shutoff safety features  - Do not climb ladders or use heavy machinery until seizure-free for 6 months  - Use the back burners when cooking  - If you have children, change diapers on the floor and avoid holding them while taking stairs  - Do not drive until seizure-free for 6 months    Triggers:  - Be sure to take you medication as scheduled without missed doses  - Be sure to get 8 hours of sleep each night  - Certain medications to avoid:   - Benadryl (diphenhydramine)   - Tramadol (Ultram)   - Certain antibiotics in the fluoroquinolone class (levaquin or cipro)   - Wellbutrin    - Chantix   - Alcohol   - Other illegal drugs or stimulant medications  - Herbal supplements to avoid that can lower the seizure threshold:   - Asafoetida, Borage, Ephedra, Ergot, Eucalyptus, Evening primrose, Ginkgo biloba, Ginseng, Pennyroyal, Kyrie, Shankpushpi, Star anise, Star fruit, Wormwood, and Yohimbine

## 2024-09-27 ENCOUNTER — PATIENT MESSAGE (OUTPATIENT)
Dept: RADIOLOGY | Facility: HOSPITAL | Age: 24
End: 2024-09-27
Payer: COMMERCIAL

## 2024-09-30 ENCOUNTER — HOSPITAL ENCOUNTER (OUTPATIENT)
Dept: RADIOLOGY | Facility: HOSPITAL | Age: 24
Discharge: HOME | End: 2024-09-30
Attending: FAMILY MEDICINE
Payer: COMMERCIAL

## 2024-09-30 DIAGNOSIS — R56.9 CONVULSIONS, UNSPECIFIED CONVULSION TYPE: ICD-10-CM

## 2024-09-30 PROCEDURE — 70553 MRI BRAIN STEM W/O & W/DYE: CPT | Mod: 26,,, | Performed by: RADIOLOGY

## 2024-09-30 PROCEDURE — 70553 MRI BRAIN STEM W/O & W/DYE: CPT | Mod: TC,PO

## 2024-09-30 PROCEDURE — A9585 GADOBUTROL INJECTION: HCPCS | Mod: PO | Performed by: FAMILY MEDICINE

## 2024-09-30 PROCEDURE — 25500020 PHARM REV CODE 255: Mod: PO | Performed by: FAMILY MEDICINE

## 2024-09-30 RX ORDER — GADOBUTROL 604.72 MG/ML
8 INJECTION INTRAVENOUS
Status: COMPLETED | OUTPATIENT
Start: 2024-09-30 | End: 2024-09-30

## 2024-09-30 RX ADMIN — GADOBUTROL 8 ML: 604.72 INJECTION INTRAVENOUS at 12:09

## 2024-10-24 ENCOUNTER — TELEPHONE (OUTPATIENT)
Dept: NEUROLOGY | Facility: CLINIC | Age: 24
End: 2024-10-24
Payer: COMMERCIAL

## 2024-10-24 NOTE — TELEPHONE ENCOUNTER
----- Message from Marcia Hays MD sent at 10/24/2024  9:46 AM CDT -----  Please let him know his EEG was normal. No sign of seizure on the EEG.

## 2024-12-17 ENCOUNTER — TELEPHONE (OUTPATIENT)
Dept: FAMILY MEDICINE | Facility: CLINIC | Age: 24
End: 2024-12-17
Payer: COMMERCIAL

## 2024-12-17 NOTE — TELEPHONE ENCOUNTER
Immunization report printed. Immun forms signed by Dr Mendosa and at . Tried to reach pt. No answer. Left message for pt ready to be picked up.